# Patient Record
Sex: FEMALE | Race: BLACK OR AFRICAN AMERICAN
[De-identification: names, ages, dates, MRNs, and addresses within clinical notes are randomized per-mention and may not be internally consistent; named-entity substitution may affect disease eponyms.]

---

## 2017-03-11 ENCOUNTER — HOSPITAL ENCOUNTER (EMERGENCY)
Dept: HOSPITAL 62 - ER | Age: 67
Discharge: HOME | End: 2017-03-11
Payer: MEDICARE

## 2017-03-11 VITALS — DIASTOLIC BLOOD PRESSURE: 86 MMHG | SYSTOLIC BLOOD PRESSURE: 142 MMHG

## 2017-03-11 DIAGNOSIS — Z98.51: ICD-10-CM

## 2017-03-11 DIAGNOSIS — R07.9: ICD-10-CM

## 2017-03-11 DIAGNOSIS — R53.1: Primary | ICD-10-CM

## 2017-03-11 DIAGNOSIS — I10: ICD-10-CM

## 2017-03-11 DIAGNOSIS — F32.9: ICD-10-CM

## 2017-03-11 LAB
ALBUMIN SERPL-MCNC: 3.9 G/DL (ref 3.5–5)
ALP SERPL-CCNC: 70 U/L (ref 38–126)
ALT SERPL-CCNC: 36 U/L (ref 9–52)
ANION GAP SERPL CALC-SCNC: 11 MMOL/L (ref 5–19)
AST SERPL-CCNC: 23 U/L (ref 14–36)
BASOPHILS # BLD AUTO: 0 10^3/UL (ref 0–0.2)
BASOPHILS NFR BLD AUTO: 0.8 % (ref 0–2)
BILIRUB DIRECT SERPL-MCNC: 0 MG/DL (ref 0–0.3)
BILIRUB SERPL-MCNC: 0.7 MG/DL (ref 0.2–1.3)
BUN SERPL-MCNC: 13 MG/DL (ref 7–20)
CALCIUM: 9.3 MG/DL (ref 8.4–10.2)
CHLORIDE SERPL-SCNC: 100 MMOL/L (ref 98–107)
CK MB SERPL-MCNC: < 0.22 NG/ML (ref ?–4.55)
CK SERPL-CCNC: 63 U/L (ref 30–135)
CO2 SERPL-SCNC: 29 MMOL/L (ref 22–30)
CREAT SERPL-MCNC: 0.8 MG/DL (ref 0.52–1.25)
EOSINOPHIL # BLD AUTO: 0.1 10^3/UL (ref 0–0.6)
EOSINOPHIL NFR BLD AUTO: 2.9 % (ref 0–6)
ERYTHROCYTE [DISTWIDTH] IN BLOOD BY AUTOMATED COUNT: 12.8 % (ref 11.5–14)
GLUCOSE SERPL-MCNC: 87 MG/DL (ref 75–110)
HCT VFR BLD CALC: 33.5 % (ref 36–47)
HGB BLD-MCNC: 11.8 G/DL (ref 12–15.5)
HGB HCT DIFFERENCE: 1.9
LYMPHOCYTES # BLD AUTO: 2.2 10^3/UL (ref 0.5–4.7)
LYMPHOCYTES NFR BLD AUTO: 55.5 % (ref 13–45)
MCH RBC QN AUTO: 32.8 PG (ref 27–33.4)
MCHC RBC AUTO-ENTMCNC: 35.4 G/DL (ref 32–36)
MCV RBC AUTO: 93 FL (ref 80–97)
MONOCYTES # BLD AUTO: 0.4 10^3/UL (ref 0.1–1.4)
MONOCYTES NFR BLD AUTO: 10.4 % (ref 3–13)
NEUTROPHILS # BLD AUTO: 1.2 10^3/UL (ref 1.7–8.2)
NEUTS SEG NFR BLD AUTO: 30.4 % (ref 42–78)
POTASSIUM SERPL-SCNC: 3.5 MMOL/L (ref 3.6–5)
PROT SERPL-MCNC: 7.4 G/DL (ref 6.3–8.2)
RBC # BLD AUTO: 3.62 10^6/UL (ref 3.72–5.28)
SODIUM SERPL-SCNC: 139.5 MMOL/L (ref 137–145)
TROPONIN I SERPL-MCNC: < 0.012 NG/ML
WBC # BLD AUTO: 3.9 10^3/UL (ref 4–10.5)

## 2017-03-11 PROCEDURE — 99285 EMERGENCY DEPT VISIT HI MDM: CPT

## 2017-03-11 PROCEDURE — 84484 ASSAY OF TROPONIN QUANT: CPT

## 2017-03-11 PROCEDURE — 80053 COMPREHEN METABOLIC PANEL: CPT

## 2017-03-11 PROCEDURE — 82553 CREATINE MB FRACTION: CPT

## 2017-03-11 PROCEDURE — 82550 ASSAY OF CK (CPK): CPT

## 2017-03-11 PROCEDURE — 93010 ELECTROCARDIOGRAM REPORT: CPT

## 2017-03-11 PROCEDURE — 93005 ELECTROCARDIOGRAM TRACING: CPT

## 2017-03-11 PROCEDURE — 85025 COMPLETE CBC W/AUTO DIFF WBC: CPT

## 2017-03-11 PROCEDURE — 71020: CPT

## 2017-03-11 PROCEDURE — 36415 COLL VENOUS BLD VENIPUNCTURE: CPT

## 2017-03-11 NOTE — ER DOCUMENT REPORT
ED Medical Screen (RME)





- General


Stated Complaint: CHEST TIGHTNESS


Mode of Arrival: Ambulatory


Information source: Patient


Notes: 


Patient presents emergency department with complaints of her chest feeling 

funny and SOB for the past 2 weeks.  She does report pain to her left hand.  

Denies past medical history of cardiac disease.








I have greeted and performed a rapid initial assessment of this patient.  A 

comprehensive ED assessment and evaluation of the patient, analysis of test 

results and completion of the medical decision making process will be conducted 

by additional ED providers.


TRAVEL OUTSIDE OF THE [place] IN LAST 30 DAYS: No





- Related Data


Allergies/Adverse Reactions: 


 





No Known Allergies Allergy (Unverified 07/22/14 00:37)


 











Past Medical History





- Past Medical History


Cardiac Medical History: Reports: Hx Hypertension


   Denies: Hx Atrial Fibrillation, Hx Congestive Heart Failure, Hx Heart Attack


Neurological Medical History: Denies: Hx Cerebrovascular Accident


Musculoskeltal Medical History: Reports Hx Arthritis


Past Surgical History: Reports: Hx Herniorrhaphy, Hx Tonsillectomy, Hx Tubal 

Ligation





- Immunizations


Hx Diphtheria, Pertussis, Tetanus Vaccination:  - unk

## 2017-03-11 NOTE — ER DOCUMENT REPORT
ED General





- General


Chief Complaint: Chest Pain


Stated Complaint: CHEST TIGHTNESS


Mode of Arrival: Ambulatory


Information source: Patient


Notes: 


66-year-old female history of depression anxiety presents with complaints of 

feeling weak and not wanting to do any housework.  Patient notes she has a 

history of depression feels like this is her depression worsening.  Patient 

denies any actual chest pain states that she will have palpitations which are 

consistent with her previous anxiety issues.  Patient notes when she takes her 

Xanax for anxiety it resolves.  Patient has had multiple stress tests denies 

any previous cardiac history.  Patient notes that there is been no family 

cardiac history except for congestive heart failure in her mother


TRAVEL OUTSIDE OF THE [place] IN LAST 30 DAYS: No





- HPI


Onset: Other - Symptoms have been ongoing now for years


Onset/Duration: Intermittent


Quality of pain: No pain


Severity: Mild


Pain Level: Denies


Associated symptoms: Weakness


Exacerbated by: Denies


Relieved by: Denies


Similar symptoms previously: Yes


Recently seen / treated by doctor: Yes





- Related Data


Allergies/Adverse Reactions: 


 





No Known Allergies Allergy (Verified 03/11/17 16:09)


 











Past Medical History





- General


Information source: Patient





- Social History


Smoking Status: Never Smoker


Cigarette use (# per day): No


Chew tobacco use (# tins/day): No


Smoking Education Provided: No


Frequency of alcohol use: None


Drug Abuse: None


Family History: Reviewed & Not Pertinent, DM, Hypertension, Thyroid Disfunction


Patient has suicidal ideation: No


Patient has homicidal ideation: No





- Past Medical History


Cardiac Medical History: Reports: Hx Hypertension


   Denies: Hx Atrial Fibrillation, Hx Congestive Heart Failure, Hx Heart Attack


Neurological Medical History: Denies: Hx Cerebrovascular Accident


Renal/ Medical History: Denies: Hx Peritoneal Dialysis


Musculoskeltal Medical History: Reports Hx Arthritis


Past Surgical History: Reports: Hx Herniorrhaphy, Hx Tonsillectomy, Hx Tubal 

Ligation





- Immunizations


Hx Diphtheria, Pertussis, Tetanus Vaccination: No - unk





Review of Systems





- Review of Systems


Notes: 


REVIEW OF SYSTEMS:


CONSTITUTIONAL :  Denies fever,  chills, or sweats.  Denies recent illness.


EENT:   Denies eye, ear, throat, or mouth pain or symptoms.  Denies nasal or 

sinus congestion or discharge.  Denies throat, tongue, or mouth swelling or 

difficulty swallowing.


CARDIOVASCULAR: Admits to palpitations


RESPIRATORY:  Denies cough, cold, or chest congestion.  Denies shortness of 

breath, difficulty breathing, or wheezing.


GASTROINTESTINAL:  Denies abdominal pain or distention.  Denies nausea, vomiting

, or diarrhea.  Denies blood in vomitus, stools, or per rectum.  Denies black, 

tarry stools.  Denies constipation. 


GENITOURINARY:  Denies difficulty urinating, painful urination, burning, 

frequency, blood in urine, or discharge.


FEMALE  GENITOURINARY:  Denies vaginal bleeding, heavy or abnormal periods, 

irregular periods.  Denies vaginal discharge or odor. 


MUSCULOSKELETAL:  Denies back or neck pain or stiffness.  Denies joint pain or 

swelling.


SKIN:   Denies rash, lesions or sores.


HEMATOLOGIC :   Denies easy bruising or bleeding.


LYMPHATIC:  Denies swollen, enlarged glands.


NEUROLOGICAL:  Admits to feeling generalized weakness


PSYCHIATRIC: Admits to depression and anxiety





ALL OTHER SYSTEMS REVIEWED AND NEGATIVE.








Dictation was performed using Dragon voice recognition software 











PHYSICAL EXAMINATION:





GENERAL: Well-appearing, well-nourished and in no acute distress.





HEAD: Atraumatic, normocephalic.





EYES: Pupils equal round and reactive to light, extraocular movements intact, 

conjunctiva are normal.





ENT: Nares patent, oropharynx clear without exudates.  Moist mucous membranes.





NECK: Normal range of motion, supple without lymphadenopathy





LUNGS: Breath sounds clear to auscultation bilaterally and equal.  No wheezes 

rales or rhonchi.





HEART: Regular rate and rhythm without murmurs





ABDOMEN: Soft, nontender, nondistended abdomen.  No guarding, no rebound.  No 

masses appreciated.





Female : deferred





Musculoskeletal: Normal range of motion, no pitting or edema.  No cyanosis.





NEUROLOGICAL: Cranial nerves grossly intact.  Normal speech, normal gait.  

Normal sensory, motor exams





PSYCH: Normal mood, normal affect.





SKIN: Warm, Dry, normal turgor, no rashes or lesions noted.





Physical Exam





- Vital signs


Vitals: 


 











Temp Pulse Resp BP Pulse Ox


 


 98.8 F   60   20   128/75 H  97 


 


 03/11/17 16:09  03/11/17 16:09  03/11/17 16:09  03/11/17 16:09 03/11/17 16:09














Course





- Re-evaluation


Re-evalutation: 





03/11/17 18:34


Physical examination noted no significant abnormality, patient notes complaints 

appeared to be more related to anxiety and depression rather than a cardiac 

event.  Patient was nonetheless offered admission defers at this time states 

that since lab work looks well at this time she wishes to go home.  Patient has 

been instructed to follow-up with cardiology given that this is not a complete 

cardiac evaluation.  Patient states she understands and will do so








After performing a Medical Screening Examination, I estimate there is LOW risk 

for RUPTURED ESOPHAGUS, PNEUMOTHORAX, PULMONARY EMBOLISM, ACUTE CORONARY 

SYNDROME, OR THORACIC AORTIC DISSECTION, thus I consider the discharge 

disposition reasonable. The patient and I have discussed the diagnosis and risks

, and we agree with discharging home with close follow-up. We also discussed 

returning to the Emergency Department immediately if new or worsening symptoms 

occur. We have discussed the symptoms which are most concerning (e.g., bloody 

sputum, worsening pain or shortness of breath) that necessitate immediate 

return.





- Vital Signs


Vital signs: 


 











Temp Pulse Resp BP Pulse Ox


 


 98.8 F   60   18   128/75 H  97 


 


 03/11/17 16:09  03/11/17 16:09  03/11/17 18:12  03/11/17 16:09  03/11/17 16:09














- Laboratory


Result Diagrams: 


 03/11/17 16:25





 03/11/17 16:25


Laboratory results interpreted by me: 


 











  03/11/17 03/11/17





  16:25 16:25


 


WBC  3.9 L 


 


RBC  3.62 L 


 


Hgb  11.8 L 


 


Hct  33.5 L 


 


Plt Count  143 L 


 


Seg Neutrophils %  30.4 L 


 


Lymphocytes %  55.5 H 


 


Absolute Neutrophils  1.2 L 


 


Potassium   3.5 L














- Diagnostic Test


Radiology reviewed: Image reviewed, Reports reviewed





- EKG Interpretation by Me


EKG shows normal: Sinus rhythm, Axis, Intervals, QRS Complexes


Voltage: Consistant with [facility]





Discharge





- Discharge


Clinical Impression: 


 Weakness





Depression


Qualifiers:


 Depression Type: unspecified Qualified Code(s): F32.9 - Major depressive 

disorder, single episode, unspecified





Condition: Stable


Disposition: HOME, SELF-CARE


Instructions:  Chest Pain of Unclear Cause ([facility])


Additional Instructions: 


At this time no life-threatening issues are noted, you must follow-up with 

cardiologist for reevaluation.  Return immediately for any other concerns


Referrals: 


MAGGIE HERNANDEZ MD [ACTIVE STAFF] - Follow up tomorrow

## 2017-03-12 NOTE — EKG REPORT
SEVERITY:- ABNORMAL ECG -

SINUS RHYTHM

LEFT VENTRICULAR HYPERTROPHY

:

Confirmed by: Sebastian Espinal 12-Mar-2017 20:17:27

## 2019-11-26 ENCOUNTER — HOSPITAL ENCOUNTER (OUTPATIENT)
Dept: HOSPITAL 62 - ER | Age: 69
Setting detail: OBSERVATION
LOS: 1 days | Discharge: HOME | End: 2019-11-27
Attending: INTERNAL MEDICINE | Admitting: INTERNAL MEDICINE
Payer: MEDICARE

## 2019-11-26 DIAGNOSIS — M35.00: ICD-10-CM

## 2019-11-26 DIAGNOSIS — G47.33: ICD-10-CM

## 2019-11-26 DIAGNOSIS — Z98.890: ICD-10-CM

## 2019-11-26 DIAGNOSIS — E66.9: ICD-10-CM

## 2019-11-26 DIAGNOSIS — Z98.51: ICD-10-CM

## 2019-11-26 DIAGNOSIS — Z79.899: ICD-10-CM

## 2019-11-26 DIAGNOSIS — F41.9: ICD-10-CM

## 2019-11-26 DIAGNOSIS — I10: ICD-10-CM

## 2019-11-26 DIAGNOSIS — Z82.49: ICD-10-CM

## 2019-11-26 DIAGNOSIS — K21.9: ICD-10-CM

## 2019-11-26 DIAGNOSIS — R07.89: Primary | ICD-10-CM

## 2019-11-26 LAB
ADD MANUAL DIFF: NO
ALBUMIN SERPL-MCNC: 4.1 G/DL (ref 3.5–5)
ALP SERPL-CCNC: 81 U/L (ref 38–126)
ANION GAP SERPL CALC-SCNC: 8 MMOL/L (ref 5–19)
APPEARANCE UR: CLEAR
APTT PPP: (no result) S
AST SERPL-CCNC: 24 U/L (ref 14–36)
BASOPHILS # BLD AUTO: 0 10^3/UL (ref 0–0.2)
BASOPHILS NFR BLD AUTO: 0.7 % (ref 0–2)
BILIRUB DIRECT SERPL-MCNC: 0.1 MG/DL (ref 0–0.4)
BILIRUB SERPL-MCNC: 0.7 MG/DL (ref 0.2–1.3)
BILIRUB UR QL STRIP: NEGATIVE
BUN SERPL-MCNC: 9 MG/DL (ref 7–20)
CALCIUM: 9.4 MG/DL (ref 8.4–10.2)
CHLORIDE SERPL-SCNC: 107 MMOL/L (ref 98–107)
CK MB SERPL-MCNC: 0.46 NG/ML (ref ?–4.55)
CK SERPL-CCNC: 98 U/L (ref 30–135)
CO2 SERPL-SCNC: 27 MMOL/L (ref 22–30)
EOSINOPHIL # BLD AUTO: 0 10^3/UL (ref 0–0.6)
EOSINOPHIL NFR BLD AUTO: 1.1 % (ref 0–6)
ERYTHROCYTE [DISTWIDTH] IN BLOOD BY AUTOMATED COUNT: 13.2 % (ref 11.5–14)
GLUCOSE SERPL-MCNC: 90 MG/DL (ref 75–110)
GLUCOSE UR STRIP-MCNC: NEGATIVE MG/DL
HCT VFR BLD CALC: 33.7 % (ref 36–47)
HGB BLD-MCNC: 12 G/DL (ref 12–15.5)
KETONES UR STRIP-MCNC: NEGATIVE MG/DL
LYMPHOCYTES # BLD AUTO: 1.5 10^3/UL (ref 0.5–4.7)
LYMPHOCYTES NFR BLD AUTO: 36.6 % (ref 13–45)
MCH RBC QN AUTO: 32.7 PG (ref 27–33.4)
MCHC RBC AUTO-ENTMCNC: 35.8 G/DL (ref 32–36)
MCV RBC AUTO: 91 FL (ref 80–97)
MONOCYTES # BLD AUTO: 0.3 10^3/UL (ref 0.1–1.4)
MONOCYTES NFR BLD AUTO: 7.6 % (ref 3–13)
NEUTROPHILS # BLD AUTO: 2.2 10^3/UL (ref 1.7–8.2)
NEUTS SEG NFR BLD AUTO: 54 % (ref 42–78)
NITRITE UR QL STRIP: NEGATIVE
PH UR STRIP: 7 [PH] (ref 5–9)
PLATELET # BLD: 149 10^3/UL (ref 150–450)
POTASSIUM SERPL-SCNC: 3.3 MMOL/L (ref 3.6–5)
PROT SERPL-MCNC: 7.5 G/DL (ref 6.3–8.2)
PROT UR STRIP-MCNC: NEGATIVE MG/DL
RBC # BLD AUTO: 3.69 10^6/UL (ref 3.72–5.28)
SP GR UR STRIP: 1.01
TOTAL CELLS COUNTED % (AUTO): 100 %
TROPONIN I SERPL-MCNC: < 0.012 NG/ML
UROBILINOGEN UR-MCNC: NEGATIVE MG/DL (ref ?–2)
WBC # BLD AUTO: 4.1 10^3/UL (ref 4–10.5)

## 2019-11-26 PROCEDURE — 93017 CV STRESS TEST TRACING ONLY: CPT

## 2019-11-26 PROCEDURE — 85025 COMPLETE CBC W/AUTO DIFF WBC: CPT

## 2019-11-26 PROCEDURE — 80048 BASIC METABOLIC PNL TOTAL CA: CPT

## 2019-11-26 PROCEDURE — 99285 EMERGENCY DEPT VISIT HI MDM: CPT

## 2019-11-26 PROCEDURE — 93010 ELECTROCARDIOGRAM REPORT: CPT

## 2019-11-26 PROCEDURE — 36415 COLL VENOUS BLD VENIPUNCTURE: CPT

## 2019-11-26 PROCEDURE — 84443 ASSAY THYROID STIM HORMONE: CPT

## 2019-11-26 PROCEDURE — 83690 ASSAY OF LIPASE: CPT

## 2019-11-26 PROCEDURE — 74176 CT ABD & PELVIS W/O CONTRAST: CPT

## 2019-11-26 PROCEDURE — 82553 CREATINE MB FRACTION: CPT

## 2019-11-26 PROCEDURE — 83735 ASSAY OF MAGNESIUM: CPT

## 2019-11-26 PROCEDURE — G0378 HOSPITAL OBSERVATION PER HR: HCPCS

## 2019-11-26 PROCEDURE — 94660 CPAP INITIATION&MGMT: CPT

## 2019-11-26 PROCEDURE — 84481 FREE ASSAY (FT-3): CPT

## 2019-11-26 PROCEDURE — 84484 ASSAY OF TROPONIN QUANT: CPT

## 2019-11-26 PROCEDURE — 78452 HT MUSCLE IMAGE SPECT MULT: CPT

## 2019-11-26 PROCEDURE — 82550 ASSAY OF CK (CPK): CPT

## 2019-11-26 PROCEDURE — 93005 ELECTROCARDIOGRAM TRACING: CPT

## 2019-11-26 PROCEDURE — 80053 COMPREHEN METABOLIC PANEL: CPT

## 2019-11-26 PROCEDURE — A9500 TC99M SESTAMIBI: HCPCS

## 2019-11-26 PROCEDURE — 81001 URINALYSIS AUTO W/SCOPE: CPT

## 2019-11-26 PROCEDURE — 84439 ASSAY OF FREE THYROXINE: CPT

## 2019-11-26 RX ADMIN — ASPIRIN SCH MG: 81 TABLET, CHEWABLE ORAL at 20:58

## 2019-11-26 NOTE — RADIOLOGY REPORT (SQ)
EXAM DESCRIPTION:  CT ABD/PELVIS NO ORAL OR IV



COMPLETED DATE/TIME:  11/26/2019 1:36 pm



REASON FOR STUDY:  left sided abdominal pain



COMPARISON:  None.



TECHNIQUE:  CT scan of the abdomen and pelvis performed without intravenous or oral contrast. Images 
reviewed with lung, soft tissue, and bone windows. Reconstructed coronal and sagittal MPR images revi
ewed. All images stored on PACS.

All CT scanners at this facility use dose modulation, iterative reconstruction, and/or weight based d
osing when appropriate to reduce radiation dose to as low as reasonably achievable (ALARA).

[facility]: Dose Right  [facility]: CareDose    [facility]: Dose Right    [facility]: Teradose 4D    [facility]: Smart [employer]



RADIATION DOSE:  CT Rad equipment meets quality standard of care and radiation dose reduction techniq
ues were employed. CTDIvol: 8.6 mGy. DLP: 470 mGy-cm.mGy.



LIMITATIONS:  None.



FINDINGS:  LOWER CHEST: Scattered coronary atherosclerosis.  . No nodules or infiltrates.

NON-CONTRASTED LIVER, SPLEEN, ADRENALS: Evaluation limited by lack of IV contrast. No identified sign
ificant masses.

PANCREAS: No masses. No peripancreatic inflammatory changes.

GALLBLADDER: No identified stones by CT criteria. No inflammatory changes to suggest cholecystitis.

RIGHT KIDNEY AND URETER: No suspicious masses. Assessment limited by lack of IV contrast.   No signif
icant calcifications.   No hydronephrosis or hydroureter.

LEFT KIDNEY AND URETER: No suspicious masses. Assessment limited by lack of IV contrast.   No signifi
cant calcifications.   No hydronephrosis or hydroureter.

AORTA AND RETROPERITONEUM: Mild scattered aortoiliac atherosclerosis without aneurysm.  No retroperit
sinclair mass, hemorrhage or adenopathy.

BOWEL AND PERITONEAL CAVITY: No focal bowel wall thickening.  No evidence of intestinal obstruction. 
 No significant free fluid or free intraperitoneal gas.

APPENDIX: Not identified.

PELVIS, BLADDER, AND ABDOMINAL WALL:No abnormal masses. No free fluid. Bladder normal.

BONES: No acute bony abnormality.  No suspicious lytic or blastic osseous lesions.  Lower lumbar face
t arthropathy.

OTHER: No other significant finding.



IMPRESSION:  No evidence of acute intra-abdominal/ pelvic process on this noncontrast exam.



COMMENT:  Quality ID # [id number]: Final reports with documentation of one or more dose reduction techniques
 (e.g., Automated exposure control, adjustment of the mA and/or kV according to patient size, use of 
iterative reconstruction technique)



TECHNICAL DOCUMENTATION:  JOB ID:  [id number]

 2011 [employer]- All Rights Reserved



Reading location - IP/workstation name: [email or web address]

## 2019-11-26 NOTE — PDOC H&P
History of Present Illness


Admission Date/PCP: 


  11/26/19 17:36





  AXEL CABRERA MD





History of Present Illness: 


AC HELLER is a 69 year old female past medical history of GERD, hypertensi

on, Sjogren's syndrome, anxiety, take to ED complaining of left lower chest and 

abdominal discomfort.  Patient is stating that she has been having diffuse 

abdominal distention, epigastric discomfort radiating to the chest for the last 

several months, has been to her PCP and worked up but has not been able to 

identify the cause of her abdominal and chest discomfort, stating that the 

discomfort has been getting worse and she was referred to see a cardiologist by 

her PCP, as her pain was getting worse she presented to ED to be further 

evaluated.  Denies any fever, shortness of breath, nausea, vomiting, diarrhea, 

constipation or any urinary symptoms.





Patient also endorses history of chronic GERD however has not been evaluated by 

GI and is not on PPIs.





In ED abdominal CT did not show any abnormalities and hospital was consulted for

further management.





Past Medical History


Cardiac Medical History: Reports: Hypertension


   Denies: Atrial Fibrillation, Congestive Heart Failure, Myocardial Infarction


Musculoskeltal Medical History: Reports: Arthritis





Past Surgical History


Past Surgical History: Reports: Herniorrhaphy, Tonsillectomy, Tubal Ligation





Social History


Smoking Status: Never Smoker


Electronic Cigarette use?: No





Family History


Family History: Reviewed & Not Pertinent, DM, Hypertension, Thyroid Disfunction


Parental Family History Reviewed: Yes


Children Family History Reviewed: Yes


Sibling(s) Family History Reviewed.: Yes





Medication/Allergy


Home Medications: 








Alprazolam [Xanax 0.5 mg Tablet] 0.5 mg PO DAILYP PRN 11/26/19 


Atenolol [Tenormin 50 mg Tablet] 50 mg PO DAILY 11/26/19 


Hydrochlorothiazide [Hydrodiuril 25 mg Tablet] 25 mg PO DAILY 11/26/19 


Hydroxychloroquine Sulfate [Plaquenil 200 mg Tablet] 200 mg PO BID 11/26/19 


Pantoprazole Sodium [Protonix] 40 mg PO BID 42 Days #84 tablet. 11/27/19 








Allergies/Adverse Reactions: 


                                        





No Known Allergies Allergy (Verified 11/26/19 15:00)


   











Review of Systems


Review of Systems: 


as per hpi





Physical Exam


Vital Signs: 


                                        











Temp Pulse Resp BP Pulse Ox


 


 98.7 F      16   142/75 H  99 


 


 11/26/19 12:45     11/26/19 18:16  11/26/19 18:16  11/26/19 18:16








                                 Intake & Output











 11/25/19 11/26/19 11/27/19





 06:59 06:59 06:59


 


Weight   81 kg











General appearance: PRESENT: obese


Head exam: PRESENT: atraumatic, normocephalic


Respiratory exam: PRESENT: clear to auscultation james.  ABSENT: rales, rhonchi, 

wheezes


Cardiovascular exam: PRESENT: RRR.  ABSENT: diastolic murmur, rubs, systolic 

murmur


Extremities exam: PRESENT: full ROM.  ABSENT: calf tenderness, clubbing, pedal 

edema


Neurological exam: PRESENT: alert, awake, oriented to person, oriented to place,

oriented to time, oriented to situation, CN II-XII grossly intact.  ABSENT: 

motor sensory deficit





Results


Laboratory Results: 


                                        





                                 11/26/19 12:31 





                                 11/26/19 12:31 





                                        











  11/26/19 11/26/19 11/26/19





  12:31 12:31 13:35


 


WBC  4.1  


 


RBC  3.69 L  


 


Hgb  12.0  


 


Hct  33.7 L  


 


MCV  91  


 


MCH  32.7  


 


MCHC  35.8  


 


RDW  13.2  


 


Plt Count  149 L  


 


Seg Neutrophils %  54.0  


 


Sodium   142.2 


 


Potassium   3.3 L 


 


Chloride   107 


 


Carbon Dioxide   27 


 


Anion Gap   8 


 


BUN   9 


 


Creatinine   0.79 


 


Est GFR ( Amer)   > 60 


 


Glucose   90 


 


Calcium   9.4 


 


Total Bilirubin   0.7 


 


AST   24 


 


Alkaline Phosphatase   81 


 


Total Protein   7.5 


 


Albumin   4.1 


 


Urine Color    STRAW


 


Urine Appearance    CLEAR


 


Urine pH    7.0


 


Ur Specific Gravity    1.010


 


Urine Protein    NEGATIVE


 


Urine Glucose (UA)    NEGATIVE


 


Urine Ketones    NEGATIVE


 


Urine Blood    NEGATIVE


 


Urine Nitrite    NEGATIVE


 


Ur Leukocyte Esterase    NEGATIVE


 


Urine WBC (Auto)    1


 


Urine RBC (Auto)    1








                                        











  11/26/19 11/26/19 11/26/19





  12:31 12:31 17:03


 


Creatine Kinase  98  


 


CK-MB (CK-2)   0.46 


 


Troponin I   < 0.012  < 0.012











Impressions: 


                                        





Abdomen/Pelvis CT  11/26/19 13:16


IMPRESSION:  No evidence of acute intra-abdominal/ pelvic process on this nonco

ntrast exam.


 














Assessment and Plan





- Diagnosis


(1) Chest pain


Qualifiers: 


   Chest pain type: unspecified   Qualified Code(s): R07.9 - Chest pain, 

unspecified   


Is this a current diagnosis for this admission?: Yes   


Plan: 


Unlikely this is cardiac in origin.  Likely due to underlying chronic GERD.


Admit to telemetry, antiplatelets, statins, beta-blockers, PRN morphine, PRN 

nitroglycerin, supplemental oxygen, trend troponins, stress test for further 

risk stratification.








(2) HTN (hypertension)


Is this a current diagnosis for this admission?: Yes   


Plan: 


Normotensive.  Euvolemic.  Restart home meds.  Adjust meds as needed.  

Outpatient PCP follow-up.








(3) NICHOLAS (obstructive sleep apnea)


Is this a current diagnosis for this admission?: Yes   


Plan: 


Has CPAP at home.  Will start on nocturnal CPAP.  Outpatient PCP and pulmonology

follow-up.








(4) Sjogrens syndrome


Is this a current diagnosis for this admission?: Yes   


Plan: 


Not in any acute flare.  Restart home meds.  Outpatient rheumatology follow-up.








(5) Anxiety


Is this a current diagnosis for this admission?: Yes   


Plan: 


Restart home meds.








(6) GERD (gastroesophageal reflux disease)


Qualifiers: 


   Esophagitis presence: esophagitis presence not specified   Qualified Code(s):

K21.9 - Gastro-esophageal reflux disease without esophagitis   


Is this a current diagnosis for this admission?: Yes   


Plan: 


Chronic.  Has not been evaluated by GI.  Denies any melena or weight changes.


Will start on PPIs.

## 2019-11-26 NOTE — EKG REPORT
SEVERITY:- ABNORMAL ECG -

SINUS RHYTHM

LEFT VENTRICULAR HYPERTROPHY

:

Confirmed by: Sebastian Espinal 26-Nov-2019 18:33:31

## 2019-11-27 VITALS — DIASTOLIC BLOOD PRESSURE: 70 MMHG | SYSTOLIC BLOOD PRESSURE: 146 MMHG

## 2019-11-27 LAB
ADD MANUAL DIFF: NO
ANION GAP SERPL CALC-SCNC: 7 MMOL/L (ref 5–19)
BASOPHILS # BLD AUTO: 0 10^3/UL (ref 0–0.2)
BASOPHILS NFR BLD AUTO: 0.5 % (ref 0–2)
BUN SERPL-MCNC: 13 MG/DL (ref 7–20)
CALCIUM: 9.4 MG/DL (ref 8.4–10.2)
CHLORIDE SERPL-SCNC: 110 MMOL/L (ref 98–107)
CO2 SERPL-SCNC: 27 MMOL/L (ref 22–30)
EOSINOPHIL # BLD AUTO: 0.1 10^3/UL (ref 0–0.6)
EOSINOPHIL NFR BLD AUTO: 1.9 % (ref 0–6)
ERYTHROCYTE [DISTWIDTH] IN BLOOD BY AUTOMATED COUNT: 12.9 % (ref 11.5–14)
FREE T4 (FREE THYROXINE): 0.98 NG/DL (ref 0.78–2.19)
GLUCOSE SERPL-MCNC: 88 MG/DL (ref 75–110)
HCT VFR BLD CALC: 31.9 % (ref 36–47)
HGB BLD-MCNC: 11.5 G/DL (ref 12–15.5)
LYMPHOCYTES # BLD AUTO: 1.7 10^3/UL (ref 0.5–4.7)
LYMPHOCYTES NFR BLD AUTO: 46.6 % (ref 13–45)
MCH RBC QN AUTO: 32.9 PG (ref 27–33.4)
MCHC RBC AUTO-ENTMCNC: 36 G/DL (ref 32–36)
MCV RBC AUTO: 92 FL (ref 80–97)
MONOCYTES # BLD AUTO: 0.4 10^3/UL (ref 0.1–1.4)
MONOCYTES NFR BLD AUTO: 11 % (ref 3–13)
NEUTROPHILS # BLD AUTO: 1.5 10^3/UL (ref 1.7–8.2)
NEUTS SEG NFR BLD AUTO: 40 % (ref 42–78)
PLATELET # BLD: 140 10^3/UL (ref 150–450)
POTASSIUM SERPL-SCNC: 4.1 MMOL/L (ref 3.6–5)
RBC # BLD AUTO: 3.49 10^6/UL (ref 3.72–5.28)
T3FREE SERPL-MCNC: 2.8 PG/ML (ref 2.77–5.27)
TOTAL CELLS COUNTED % (AUTO): 100 %
TSH SERPL-ACNC: 2.81 UIU/ML (ref 0.47–4.68)
WBC # BLD AUTO: 3.7 10^3/UL (ref 4–10.5)

## 2019-11-27 RX ADMIN — ASPIRIN SCH MG: 81 TABLET, CHEWABLE ORAL at 10:33

## 2019-11-29 NOTE — PDOC DISCHARGE SUMMARY
Impression





- Admit/DC Date/PCP


Admission Date/Primary Care Provider: 


  11/26/19 17:36





  AXEL CABRERA MD





Discharge Date: 11/27/19





- Discharge Diagnosis


(1) Chest pain


Is this a current diagnosis for this admission?: Yes   





(2) HTN (hypertension)


Is this a current diagnosis for this admission?: Yes   





(3) NICHOLAS (obstructive sleep apnea)


Is this a current diagnosis for this admission?: Yes   





(4) Sjogrens syndrome


Is this a current diagnosis for this admission?: Yes   





(5) Anxiety


Is this a current diagnosis for this admission?: Yes   





(6) GERD (gastroesophageal reflux disease)


Is this a current diagnosis for this admission?: Yes   





- Additional Information


Resuscitation Status: Full Code


Discharge Diet: As Tolerated


Discharge Activity: Activity As Tolerated, Balance Activity w/Rest


Referrals: 


XAEL CABRERA MD [Primary Care Provider] - 


()


Prescriptions: 


Pantoprazole Sodium [Protonix] 40 mg PO BID 42 Days #84 tablet.


Home Medications: 








Alprazolam [Xanax 0.5 mg Tablet] 0.5 mg PO DAILYP PRN 11/26/19 


Atenolol [Tenormin 50 mg Tablet] 50 mg PO DAILY 11/26/19 


Hydrochlorothiazide [Hydrodiuril 25 mg Tablet] 25 mg PO DAILY 11/26/19 


Hydroxychloroquine Sulfate [Plaquenil 200 mg Tablet] 200 mg PO BID 11/26/19 


Pantoprazole Sodium [Protonix] 40 mg PO BID 42 Days #84 tablet. 11/27/19 











History of Present Illiness


History of Present Illness: 


AC HELLER is a 69 year old female past medical history of GERD, 

hypertension, Sjogren's syndrome, anxiety, take to ED complaining of left lower 

chest and abdominal discomfort.  Patient is stating that she has been having 

diffuse abdominal distention, epigastric discomfort radiating to the chest for 

the last several months, has been to her PCP and worked up but has not been able

to identify the cause of her abdominal and chest discomfort, stating that the 

discomfort has been getting worse and she was referred to see a cardiologist by 

her PCP, as her pain was getting worse she presented to ED to be further 

evaluated.  Denies any fever, shortness of breath, nausea, vomiting, diarrhea, 

constipation or any urinary symptoms.





Patient also endorses history of chronic GERD however has not been evaluated by 

GI and is not on PPIs.





In ED abdominal CT did not show any abnormalities and hospital was consulted for

further management.








Hospital Course


Hospital Course: 


(1) Chest pain/discomfort


Resoled. Likely due to underlying chronic untreated GERD.


Patient has significant improvement of her abdominal and chest discomfort after 

being started on high-dose PPIs.


Was admitted telemetry, antiplatelets, statins, beta-blockers, PRN morphine, PRN

nitroglycerin, supplemental oxygen. 


Troponins negative x3, EKG no change acute changes, nuclear stress test 

negative. 





Patient was discharged home to restart home meds and pantoprazole 40 mg twice 

daily for 6 weeks and follow-up with PCP and gastroenterologist.





(2) HTN (hypertension)


Normotensive.  Euvolemic.  


Restarted on home meds.  





(3) NICHOLAS (obstructive sleep apnea)


Has CPAP at home.  


Was a started on nocturnal CPAP.  





(4) Sjogrens syndrome


Not in any acute flare.  


Restarted on home meds.


Outpatient rheumatology follow-up.





(5) Anxiety


Restart home meds.





(6) GERD (gastroesophageal reflux disease)


Chronic.  Has not been evaluated by GI.  Denies any melena or weight changes.


Was a started on pantoprazole 40 mg twice daily.


Patient was discharged home to restart home meds and pantoprazole 40 mg twice 

daily for 6 weeks and follow-up with PCP and gastroenterologist.





Physical Exam


Vital Signs: 


                                        











Temp Pulse Resp BP Pulse Ox


 


 98.6 F   74   16   146/70 H  100 


 


 11/27/19 15:56  11/27/19 15:56  11/27/19 15:56  11/27/19 15:56  11/27/19 15:56











General appearance: PRESENT: no acute distress, well-developed, well-nourished


Head exam: PRESENT: atraumatic, normocephalic


Respiratory exam: PRESENT: clear to auscultation james.  ABSENT: rales, rhonchi, 

wheezes


Cardiovascular exam: PRESENT: RRR.  ABSENT: diastolic murmur, rubs, systolic 

murmur


GI/Abdominal exam: PRESENT: normal bowel sounds, soft.  ABSENT: distended, 

guarding, mass, organolmegaly, rebound, tenderness


Neurological exam: PRESENT: alert, awake, oriented to person, oriented to place,

oriented to time, oriented to situation, CN II-XII grossly intact.  ABSENT: 

motor sensory deficit





Results


Laboratory Results: 


                                        











WBC  3.7 10^3/uL (4.0-10.5)  L  11/27/19  05:47    


 


RBC  3.49 10^6/uL (3.72-5.28)  L  11/27/19  05:47    


 


Hgb  11.5 g/dL (12.0-15.5)  L  11/27/19  05:47    


 


Hct  31.9 % (36.0-47.0)  L  11/27/19  05:47    


 


MCV  92 fl (80-97)   11/27/19  05:47    


 


MCH  32.9 pg (27.0-33.4)   11/27/19  05:47    


 


MCHC  36.0 g/dL (32.0-36.0)   11/27/19  05:47    


 


RDW  12.9 % (11.5-14.0)   11/27/19  05:47    


 


Plt Count  140 10^3/uL (150-450)  L  11/27/19  05:47    


 


Lymph % (Auto)  46.6 % (13-45)  H  11/27/19  05:47    


 


Mono % (Auto)  11.0 % (3-13)   11/27/19  05:47    


 


Eos % (Auto)  1.9 % (0-6)   11/27/19  05:47    


 


Baso % (Auto)  0.5 % (0-2)   11/27/19  05:47    


 


Absolute Neuts (auto)  1.5 10^3/uL (1.7-8.2)  L  11/27/19  05:47    


 


Absolute Lymphs (auto)  1.7 10^3/uL (0.5-4.7)   11/27/19  05:47    


 


Absolute Monos (auto)  0.4 10^3/uL (0.1-1.4)   11/27/19  05:47    


 


Absolute Eos (auto)  0.1 10^3/uL (0.0-0.6)   11/27/19  05:47    


 


Absolute Basos (auto)  0.0 10^3/uL (0.0-0.2)   11/27/19  05:47    


 


Seg Neutrophils %  40.0 % (42-78)  L  11/27/19  05:47    


 


Sodium  143.5 mmol/L (137-145)   11/27/19  05:47    


 


Potassium  4.1 mmol/L (3.6-5.0)   11/27/19  05:47    


 


Chloride  110 mmol/L ()  H  11/27/19  05:47    


 


Carbon Dioxide  27 mmol/L (22-30)   11/27/19  05:47    


 


Anion Gap  7  (5-19)   11/27/19  05:47    


 


BUN  13 mg/dL (7-20)   11/27/19  05:47    


 


Creatinine  0.83 mg/dL (0.52-1.25)   11/27/19  05:47    


 


Est GFR ( Amer)  > 60  (>60)   11/27/19  05:47    


 


Est GFR (MDRD) Non-Af  > 60  (>60)   11/27/19  05:47    


 


Glucose  88 mg/dL ()   11/27/19  05:47    


 


Calcium  9.4 mg/dL (8.4-10.2)   11/27/19  05:47    


 


Magnesium  1.9 mg/dL (1.6-2.3)   11/27/19  05:47    


 


Total Bilirubin  0.7 mg/dL (0.2-1.3)   11/26/19  12:31    


 


Direct Bilirubin  0.1 mg/dL (0.0-0.4)   11/26/19  12:31    


 


Neonat Total Bilirubin  Not Reportable   11/26/19  12:31    


 


Neonat Direct Bilirubin  Not Reportable   11/26/19  12:31    


 


Neonat Indirect Bili  Not Reportable   11/26/19  12:31    


 


AST  24 U/L (14-36)   11/26/19  12:31    


 


ALT  14 U/L (<35)   11/26/19  12:31    


 


Alkaline Phosphatase  81 U/L ()   11/26/19  12:31    


 


Creatine Kinase  98 U/L ()   11/26/19  12:31    


 


CK-MB (CK-2)  0.46 ng/mL (<4.55)   11/26/19  12:31    


 


Troponin I  < 0.012 ng/mL  11/26/19  23:06    


 


Total Protein  7.5 g/dL (6.3-8.2)   11/26/19  12:31    


 


Albumin  4.1 g/dL (3.5-5.0)   11/26/19  12:31    


 


Lipase  70.4 U/L ()   11/27/19  05:47    


 


TSH  2.81 uIU/mL (0.47-4.68)   11/27/19  05:47    


 


Free T4  0.98 ng/dL (0.78-2.19)   11/27/19  05:47    


 


Free T3 pg/mL  2.80 pg/mL (2.77-5.27)   11/27/19  05:47    


 


Urine Color  STRAW   11/26/19  13:35    


 


Urine Appearance  CLEAR   11/26/19  13:35    


 


Urine pH  7.0  (5.0-9.0)   11/26/19  13:35    


 


Ur Specific Gravity  1.010   11/26/19  13:35    


 


Urine Protein  NEGATIVE mg/dL (NEGATIVE)   11/26/19  13:35    


 


Urine Glucose (UA)  NEGATIVE mg/dL (NEGATIVE)   11/26/19  13:35    


 


Urine Ketones  NEGATIVE mg/dL (NEGATIVE)   11/26/19  13:35    


 


Urine Blood  NEGATIVE  (NEGATIVE)   11/26/19  13:35    


 


Urine Nitrite  NEGATIVE  (NEGATIVE)   11/26/19  13:35    


 


Urine Bilirubin  NEGATIVE  (NEGATIVE)   11/26/19  13:35    


 


Urine Urobilinogen  NEGATIVE mg/dL (<2.0)   11/26/19  13:35    


 


Ur Leukocyte Esterase  NEGATIVE  (NEGATIVE)   11/26/19  13:35    


 


Urine WBC (Auto)  1 /HPF  11/26/19  13:35    


 


Urine RBC (Auto)  1 /HPF  11/26/19  13:35    


 


Urine Bacteria (Auto)  TRACE /HPF  11/26/19  13:35    


 


Squamous Epi Cells Auto  1 /HPF  11/26/19  13:35    


 


Urine Mucus (Auto)  RARE /LPF  11/26/19  13:35    


 


Urine Ascorbic Acid  NEGATIVE  (NEGATIVE)   11/26/19  13:35    








                                        











  11/26/19 11/26/19 11/26/19





  12:31 17:03 23:06


 


CK-MB (CK-2)  0.46  


 


Troponin I  < 0.012  < 0.012  < 0.012











Impressions: 


                                        





Abdomen/Pelvis CT  11/26/19 13:16


IMPRESSION:  No evidence of acute intra-abdominal/ pelvic process on this 

noncontrast exam.


 














Stroke


Is this a Stroke Patient?: No





Acute Heart Failure





- **


Is this a Heart Failure Patient?: No

## 2019-12-01 NOTE — DRAGON STRESS TEST REPORT
Intravenous Lexiscan Cardiolite stress test using single photon emmision 
computerized tomography



Date of procedure: 11/27/2019. Ordering Provider: . Patient's status: 
In Patient.



Indication: Chest pain.  Coronary risk factors: Age, diabetes mellitus, 
hypertension, and dyslipidemia.



Resting EKG: Sinus Rhythm.  Nonspecific ST-T changes leads V1 to V4.



Stress EKG: No changes of ischemia.



Patient had no chest pain or discomfort and there were no arrhythmias seen.



Reason for termination: Protocol.



Conclusions: Normal EKG and hemodynamic response to IV Lexiscan.



Nuclear data:

At rest the patient was given 12.91 millicuries of technetium 99m sestamibi 
injected intravenously.  As per protocol rest non gated SPECT images were 
obtained.  Subsequently the patient was given intravenous Lexiscan at a dose of 
0.4 mg in 5 mL intravenously, followed by flush with normal saline.  
Subsequently the stress dose of 6.8 millicuries of technetium 99m sestamibi was 
injected intravenously.  As per protocol stress gated images were obtained. 



Nuclear interpretation: 



Review of images showed that all segments of the myocardium had normal perfusion
at rest, and normal perfusion post stress with IV Lexiscan.

All segments of the myocardium had normal motion, contraction, and thickening by
gated study.

T. I D. ratio was abnormal at 1.22.  Visually this is not reliable, and the T I 
D ratio is normal..  There is no transient ischemic dilatation of the left 
ventricle.  Computer read rest, and stress left ventricular ejection fraction 
were 58 %, and 53%, respectively.  Visually both the stress and rest ejection 
fractions were normal, and greater than 55%.



Conclusion:



1.  There is no scintigraphic evidence of Lexiscan induced myocardial ischemia.

2.  There is no scintigraphic evidence of myocardial infarction/scar.





Recommendations:

Aggressive risk factor modification, and treating the underlying co- 
morbidities.  

MTDD

## 2019-12-03 ENCOUNTER — HOSPITAL ENCOUNTER (EMERGENCY)
Dept: HOSPITAL 62 - ER | Age: 69
Discharge: HOME | End: 2019-12-03
Payer: MEDICARE

## 2019-12-03 VITALS — SYSTOLIC BLOOD PRESSURE: 145 MMHG | DIASTOLIC BLOOD PRESSURE: 72 MMHG

## 2019-12-03 DIAGNOSIS — M35.00: ICD-10-CM

## 2019-12-03 DIAGNOSIS — K21.9: ICD-10-CM

## 2019-12-03 DIAGNOSIS — Z98.51: ICD-10-CM

## 2019-12-03 DIAGNOSIS — K52.9: Primary | ICD-10-CM

## 2019-12-03 DIAGNOSIS — R42: ICD-10-CM

## 2019-12-03 DIAGNOSIS — I10: ICD-10-CM

## 2019-12-03 LAB
ADD MANUAL DIFF: NO
ALBUMIN SERPL-MCNC: 4.1 G/DL (ref 3.5–5)
ALP SERPL-CCNC: 87 U/L (ref 38–126)
ANION GAP SERPL CALC-SCNC: 11 MMOL/L (ref 5–19)
AST SERPL-CCNC: 22 U/L (ref 14–36)
BASOPHILS # BLD AUTO: 0 10^3/UL (ref 0–0.2)
BASOPHILS NFR BLD AUTO: 0.8 % (ref 0–2)
BILIRUB DIRECT SERPL-MCNC: 0.1 MG/DL (ref 0–0.4)
BILIRUB SERPL-MCNC: 0.7 MG/DL (ref 0.2–1.3)
BUN SERPL-MCNC: 14 MG/DL (ref 7–20)
CALCIUM: 9.7 MG/DL (ref 8.4–10.2)
CHLORIDE SERPL-SCNC: 106 MMOL/L (ref 98–107)
CO2 SERPL-SCNC: 24 MMOL/L (ref 22–30)
EOSINOPHIL # BLD AUTO: 0.1 10^3/UL (ref 0–0.6)
EOSINOPHIL NFR BLD AUTO: 1.4 % (ref 0–6)
ERYTHROCYTE [DISTWIDTH] IN BLOOD BY AUTOMATED COUNT: 13.2 % (ref 11.5–14)
GLUCOSE SERPL-MCNC: 156 MG/DL (ref 75–110)
HCT VFR BLD CALC: 35.4 % (ref 36–47)
HGB BLD-MCNC: 12.6 G/DL (ref 12–15.5)
LYMPHOCYTES # BLD AUTO: 1.7 10^3/UL (ref 0.5–4.7)
LYMPHOCYTES NFR BLD AUTO: 44.6 % (ref 13–45)
MCH RBC QN AUTO: 32.6 PG (ref 27–33.4)
MCHC RBC AUTO-ENTMCNC: 35.6 G/DL (ref 32–36)
MCV RBC AUTO: 91 FL (ref 80–97)
MONOCYTES # BLD AUTO: 0.4 10^3/UL (ref 0.1–1.4)
MONOCYTES NFR BLD AUTO: 9.9 % (ref 3–13)
NEUTROPHILS # BLD AUTO: 1.7 10^3/UL (ref 1.7–8.2)
NEUTS SEG NFR BLD AUTO: 43.3 % (ref 42–78)
PLATELET # BLD: 156 10^3/UL (ref 150–450)
POTASSIUM SERPL-SCNC: 3.3 MMOL/L (ref 3.6–5)
PROT SERPL-MCNC: 7.5 G/DL (ref 6.3–8.2)
RBC # BLD AUTO: 3.87 10^6/UL (ref 3.72–5.28)
TOTAL CELLS COUNTED % (AUTO): 100 %
WBC # BLD AUTO: 3.9 10^3/UL (ref 4–10.5)

## 2019-12-03 PROCEDURE — 80053 COMPREHEN METABOLIC PANEL: CPT

## 2019-12-03 PROCEDURE — 83735 ASSAY OF MAGNESIUM: CPT

## 2019-12-03 PROCEDURE — 83690 ASSAY OF LIPASE: CPT

## 2019-12-03 PROCEDURE — 83605 ASSAY OF LACTIC ACID: CPT

## 2019-12-03 PROCEDURE — 36415 COLL VENOUS BLD VENIPUNCTURE: CPT

## 2019-12-03 PROCEDURE — 96360 HYDRATION IV INFUSION INIT: CPT

## 2019-12-03 PROCEDURE — 85025 COMPLETE CBC W/AUTO DIFF WBC: CPT

## 2019-12-03 PROCEDURE — 96361 HYDRATE IV INFUSION ADD-ON: CPT

## 2019-12-03 PROCEDURE — 84484 ASSAY OF TROPONIN QUANT: CPT

## 2019-12-03 PROCEDURE — 99284 EMERGENCY DEPT VISIT MOD MDM: CPT

## 2019-12-03 PROCEDURE — 70450 CT HEAD/BRAIN W/O DYE: CPT

## 2019-12-03 PROCEDURE — 93005 ELECTROCARDIOGRAM TRACING: CPT

## 2019-12-03 PROCEDURE — 93010 ELECTROCARDIOGRAM REPORT: CPT

## 2019-12-03 NOTE — EKG REPORT
SEVERITY:- ABNORMAL ECG -

SINUS RHYTHM

PROBABLE LEFT ATRIAL ABNORMALITY

ANTERIOR Q WAVES, POSSIBLY DUE TO LVH

:

Confirmed by: Samreen Yoder MD 03-Dec-2019 14:32:19

## 2019-12-03 NOTE — ER DOCUMENT REPORT
ED General





- General


Chief Complaint: Dizziness


Stated Complaint: DIZZINESS


Time Seen by Provider: 12/03/19 08:15


Primary Care Provider: 


AXEL CABRERA MD [Primary Care Provider] - Follow up as needed


Notes: 





69 year old female past medical history of GERD, hypertension, Sjogren's 

syndrome, anxiety, NICHOLAS presents with dizziness associated with nausea and 

nonbloody, nonbilious emesis that began 1 week ago.  Also some ringing in right 

ear.  Admitted here 11/26 -29 and had a reassuring workup for chest pain with an

unremarkable lexiscan study. No chest pain today. 


TRAVEL OUTSIDE OF THE [place] IN LAST 30 DAYS: No





- HPI


Severity: Moderate


Associated symptoms: None


Exacerbated by: Denies


Relieved by: Denies





- Related Data


Allergies/Adverse Reactions: 


                                        





No Known Allergies Allergy (Verified 11/26/19 15:00)


   











Past Medical History





- Social History


Smoking Status: Former Smoker


Family History: Reviewed & Not Pertinent, DM, Hypertension, Thyroid Disfunction


Patient has suicidal ideation: No


Patient has homicidal ideation: No





- Past Medical History


Cardiac Medical History: Reports: Hx Hypertension


   Denies: Hx Atrial Fibrillation, Hx Congestive Heart Failure, Hx Heart Attack


Neurological Medical History: Denies: Hx Cerebrovascular Accident


Renal/ Medical History: Denies: Hx Peritoneal Dialysis


Musculoskeletal Medical History: Reports Hx Arthritis


Past Surgical History: Reports: Hx Herniorrhaphy, Hx Tonsillectomy, Hx Tubal 

Ligation





- Immunizations


Hx Diphtheria, Pertussis, Tetanus Vaccination: No - unk





Review of Systems





- Review of Systems


Constitutional: No symptoms reported


EENT: No symptoms reported


Cardiovascular: No symptoms reported


Respiratory: No symptoms reported


Gastrointestinal: No symptoms reported


Genitourinary: No symptoms reported


Female Genitourinary: No symptoms reported


Musculoskeletal: No symptoms reported


Skin: No symptoms reported


Hematologic/Lymphatic: No symptoms reported


Neurological/Psychological: No symptoms reported





Physical Exam





- Vital signs


Vitals: 


                                        











Temp Pulse Resp BP Pulse Ox


 


 97.5 F   84   16   170/90 H  96 


 


 12/03/19 08:12  12/03/19 08:12  12/03/19 08:12  12/03/19 08:12  12/03/19 08:12











Interpretation: Normal





- General


General appearance: Appears well, Alert





- HEENT


Head: Normocephalic, Atraumatic


Eyes: Normal


Pupils: PERRL





- Respiratory


Respiratory status: No respiratory distress


Chest status: Nontender


Breath sounds: Normal


Chest palpation: Normal





- Cardiovascular


Rhythm: Regular


Heart sounds: Normal auscultation


Murmur: No





- Abdominal


Inspection: Normal


Distension: No distension


Bowel sounds: Normal


Tenderness: Nontender


Organomegaly: No organomegaly





- Back


Back: Normal, Nontender





- Extremities


General upper extremity: Normal inspection, Nontender, Normal color, Normal ROM,

Normal temperature


General lower extremity: Normal inspection, Nontender, Normal color, Normal ROM,

Normal temperature, Normal weight bearing.  No: Andres's sign





- Neurological


Neuro grossly intact: Yes


Cognition: Normal


Orientation: AAOx4


Ruchi Coma Scale Eye Opening: Spontaneous


Mccleary Coma Scale Verbal: Oriented


Ruchi Coma Scale Motor: Obeys Commands


Ruchi Coma Scale Total: 15


Speech: Normal


Motor strength normal: LUE, RUE, LLE, RLE


Sensory: Normal





- Psychological


Associated symptoms: Normal affect, Normal mood





- Skin


Skin Temperature: Warm


Skin Moisture: Dry


Skin Color: Normal





Course





- Re-evaluation


Re-evalutation: 





12/03/19 12:08


mdm  69 year old with what appears as viral illness and feels better here after 

IVF and anti emetics. 





- Vital Signs


Vital signs: 


                                        











Temp Pulse Resp BP Pulse Ox


 


 97.5 F   84   13   154/88 H  100 


 


 12/03/19 08:12  12/03/19 08:12  12/03/19 10:23  12/03/19 10:23  12/03/19 10:23














- Laboratory


Result Diagrams: 


                                 12/03/19 08:40





                                 12/03/19 08:40


Laboratory results interpreted by me: 


                                        











  12/03/19 12/03/19





  08:40 08:40


 


WBC   3.9 L


 


Hct   35.4 L


 


Potassium  3.3 L 


 


Glucose  156 H 














- Diagnostic Test


Radiology reviewed: Reports reviewed





- EKG Interpretation by Me


EKG shows normal: Sinus rhythm


Rate: Normal - NSR NL Axis 71 BPM no st elevation or depression my 

interpretation.





Discharge





- Discharge


Clinical Impression: 


 Dizziness, Enteritis





HTN (hypertension)


Qualifiers:


 Hypertension type: unspecified Qualified Code(s): I10 - Essential (primary) 

hypertension





Sjogrens syndrome


Qualifiers:


 Sjogren's organ involvement: unspecified organ involvement Qualified Code(s): 

M35.00 - Sicca syndrome, unspecified





Disposition: HOME, SELF-CARE


Instructions:  Antinausea Medication ([facility]), Dizziness ([facility]), Meclizine ([facility])


Additional Instructions: 


See your doctor or the referral doctor in follow up.  Rest.  Please return here 

for any problems or any concerns. Clear liquids for the next 24 hours.  Your 

potassium was a bit low and should be re checked by your regular doctor in the 

next week.  


Prescriptions: 


Meclizine HCl [Antivert 12.5 mg Tablet] 12.5 mg PO TID #21 tablet


Ondansetron [Zofran Odt 4 mg Tablet] 1 - 2 tab PO Q4H PRN #15 tab.rapdis


 PRN Reason: For Nausea/Vomiting


Referrals: 


AXEL CABRERA MD [Primary Care Provider] - Follow up as needed

## 2019-12-03 NOTE — RADIOLOGY REPORT (SQ)
EXAM DESCRIPTION:  CT HEAD WITHOUT



COMPLETED DATE/TIME:  12/3/2019 10:00 am



REASON FOR STUDY:  weak/ dizzy



COMPARISON:  None.



TECHNIQUE:  Axial images acquired through the brain without intravenous contrast.  Images reviewed wi
th bone, brain and subdural windows.  Additional sagittal and coronal reconstructions were generated.
 Images stored on PACS.

All CT scanners at this facility use dose modulation, iterative reconstruction, and/or weight based d
osing when appropriate to reduce radiation dose to as low as reasonably achievable (ALARA).

[facility]: Dose Right  [facility]: CareDose    [facility]: Dose Right    [facility]: Teradose 4D    [facility]: Smart Technologies



RADIATION DOSE:  CT Rad equipment meets quality standard of care and radiation dose reduction techniq
ues were employed. CTDIvol: 53.2 mGy. DLP: 1203 mGy-cm. mGy.



LIMITATIONS:  None.



FINDINGS:  VENTRICLES: Normal size and contour.

CEREBRUM: No masses.  No hemorrhage.  No midline shift.  No evidence for acute infarction. Normal gra
y/white matter differentiation. No areas of low density in the white matter.

CEREBELLUM: No masses.  No hemorrhage.  No alteration of density.  No evidence for acute infarction.

EXTRAAXIAL SPACES: No fluid collections.  No masses.

ORBITS AND GLOBE: No intra- or extraconal masses.  Normal contour of globe without masses.

CALVARIUM: No fracture.

PARANASAL SINUSES: No fluid or mucosal thickening.

SOFT TISSUES: No mass or hematoma.

OTHER: No other significant finding.



IMPRESSION:  NORMAL BRAIN CT WITHOUT CONTRAST.

EVIDENCE OF ACUTE STROKE: NO.



COMMENT:  Quality ID # [id number]: Final reports with documentation of one or more dose reduction techniques
 (e.g., Automated exposure control, adjustment of the mA and/or kV according to patient size, use of 
iterative reconstruction technique)



TECHNICAL DOCUMENTATION:  JOB ID:  [id number]

 2011 [employer]- All Rights Reserved



Reading location - IP/workstation name: [email or web address]

## 2020-05-26 ENCOUNTER — HOSPITAL ENCOUNTER (EMERGENCY)
Dept: HOSPITAL 62 - ER | Age: 70
Discharge: HOME | End: 2020-05-26
Payer: MEDICARE

## 2020-05-26 VITALS — DIASTOLIC BLOOD PRESSURE: 75 MMHG | SYSTOLIC BLOOD PRESSURE: 159 MMHG

## 2020-05-26 DIAGNOSIS — Z87.891: ICD-10-CM

## 2020-05-26 DIAGNOSIS — R20.0: ICD-10-CM

## 2020-05-26 DIAGNOSIS — M54.32: Primary | ICD-10-CM

## 2020-05-26 DIAGNOSIS — M79.605: ICD-10-CM

## 2020-05-26 DIAGNOSIS — Z79.899: ICD-10-CM

## 2020-05-26 DIAGNOSIS — I10: ICD-10-CM

## 2020-05-26 PROCEDURE — 72110 X-RAY EXAM L-2 SPINE 4/>VWS: CPT

## 2020-05-26 PROCEDURE — 99284 EMERGENCY DEPT VISIT MOD MDM: CPT

## 2020-05-26 PROCEDURE — 93971 EXTREMITY STUDY: CPT

## 2020-05-26 NOTE — ER DOCUMENT REPORT
ED General





- General


Chief Complaint: Leg Pain


Stated Complaint: LEG PAIN


Time Seen by Provider: 05/26/20 18:51


Primary Care Provider: 


AXEL CABRERA MD [Primary Care Provider] - Follow up as needed


Mode of Arrival: Wheelchair


TRAVEL OUTSIDE OF THE [place] IN LAST 30 DAYS: No





- HPI


Notes: 





Patient is a 70-year-old female who presents to the emergency department for 

evaluation of left leg pain.  She points to the lateral aspect of the leg.  She 

states this started about 4 PM, after getting her radiation treatment.  She 

states she is had pain similar to this in the past.  It is a sharp pain, with a 

numb sensation in her fifth left toe.  She does have a history of back problems.

 She denies any bowel or bladder incontinence, no saddle anesthesia, no focal 

numbness or weakness.  She denies any chest pain or shortness of breath.  She 

states currently her pain is a 1 out of 5, earlier it was a 4 out of 5.  She 

takes Tylenol at home when her pain is severe, but states that did not really 

help tonight, so she presents to the ED for further evaluation.





- Related Data


Allergies/Adverse Reactions: 


                                        





No Known Allergies Allergy (Verified 05/26/20 18:46)


   











Past Medical History





- General


Information source: Patient





- Social History


Smoking Status: Former Smoker


Chew tobacco use (# tins/day): No


Frequency of alcohol use: None


Family History: Reviewed & Not Pertinent, DM, Hypertension, Thyroid Disfunction


Patient has homicidal ideation: No





- Past Medical History


Cardiac Medical History: Reports: Hx Hypertension


   Denies: Hx Atrial Fibrillation, Hx Congestive Heart Failure, Hx Heart Attack


Neurological Medical History: Denies: Hx Cerebrovascular Accident


Renal/ Medical History: Denies: Hx Peritoneal Dialysis


Malignancy Medical History: Reports: Other - Vulvar cancer, currently on 

radiation


Musculoskeletal Medical History: Reports Hx Arthritis


Past Surgical History: Reports: Hx Herniorrhaphy, Hx Tonsillectomy, Hx Tubal 

Ligation





- Immunizations


Hx Diphtheria, Pertussis, Tetanus Vaccination: No - unk





Review of Systems





- Review of Systems


Musculoskeletal: See HPI


-: Yes All other systems reviewed and negative





Physical Exam





- Vital signs


Vitals: 





                                        











Temp Pulse Resp BP Pulse Ox


 


 99.2 F   91   18   196/88 H  99 


 


 05/26/20 18:44  05/26/20 18:44  05/26/20 18:44  05/26/20 18:44  05/26/20 18:44














- Notes


Notes: 





Vital signs reviewed, please refer to chart. Head is normocephalic, atraumatic. 

Pupils equal round, reactive to light.  Neck is supple without meningismus.  

Heart is regular rate and rhythm.  Lungs are clear to auscultation bilaterally. 

Abdomen is soft, nontender, normoactive bowel sounds throughout.  Examination of

the spine yields no midline tenderness or step-off.  She has mild paraspinal 

musculature tenderness from the lower lumbar spine into the sacrum bilaterally. 

Extremities without cyanosis, clubbing. Posterior calves are nontender.  She is 

tender to palpation over the left fibular head.  She has 1+ nonpitting edema to 

the bilateral ankles.  Peripheral pulses are equal.  Skin is warm and dry.  

Patient is awake, alert, neurological exam is nonfocal.





Course





- Re-evaluation


Re-evalutation: 





05/26/20 22:51


Patient presents to the emergency department for evaluation.  Her symptoms are 

most consistent with sciatica.  She had lumbar spine films as well as Doppler 

ordered through triage.  DVT was not identified.  I suspect strongly that this 

is sciatic type pain in this patient with some lumbar spinal stenosis and 

arthritis.  I would head and treat the patient with steroids.  She was given 

prednisone.  I will send her home with prednisone and close follow-up.  She is 

to return to the ED with worsening.





- Vital Signs


Vital signs: 





                                        











Temp Pulse Resp BP Pulse Ox


 


 98.3 F   60   18   123/72   98 


 


 05/26/20 21:48  05/26/20 21:48  05/26/20 18:44  05/26/20 21:48  05/26/20 21:48














- Diagnostic Test


Radiology reviewed: Reports reviewed


Radiology results interpreted by me: 





05/26/20 22:53





                                        





Lumbar Spine X-Ray  05/26/20 18:52


IMPRESSION:  Minimal scoliosis.  Degenerative disc disease and spondylosis.  

Facet arthropathy.


 








Venous Doppler Study  05/26/20 18:52


IMPRESSION:


 


Negative exam


 


 


copyright 2011 [employer]- All Rights Reserved


 














Discharge





- Discharge


Clinical Impression: 


 Leg pain, left





Sciatica


Qualifiers:


 Laterality: left Qualified Code(s): M54.32 - Sciatica, left side





Condition: Stable


Disposition: HOME, SELF-CARE


Instructions:  Sciatica ([facility])


Additional Instructions: 


Moist heat to the lower back.  Take medication as prescribed until it is gone.  

Follow-up with your primary care provider this week.  Return to the emergency 

department with worsening or new concerning symptoms of any sort.


Referrals: 


AXEL CABRERA MD [Primary Care Provider] - Follow up as needed

## 2020-05-26 NOTE — ER DOCUMENT REPORT
ED Medical Screen (RME)





- General


Chief Complaint: Leg Pain


Stated Complaint: LEG PAIN


Time Seen by Provider: 05/26/20 18:51


Primary Care Provider: 


AXEL CABRERA MD [Primary Care Provider] - Follow up as needed


Mode of Arrival: Wheelchair


Information source: Patient


Notes: 





70-year-old female presented to ED for complaint of left lower leg pain for the 

last several days.  She is on treatment #19 of 33 XRT treatments for vulvar 

cancer today.  She is alert oriented respirations regular nonlabored speaking in

full sentences.  She does complaint of a lot of pain to the lower leg.

















I have greeted and performed a rapid initial assessment of this patient.  A 

comprehensive ED assessment and evaluation of the patient, analysis of test 

results and completion of medical decision making process will be conducted by 

an additional ED providers.


TRAVEL OUTSIDE OF THE [place] IN LAST 30 DAYS: No





- Related Data


Allergies/Adverse Reactions: 


                                        





No Known Allergies Allergy (Verified 05/26/20 18:46)


   











Past Medical History





- Social History


Chew tobacco use (# tins/day): No


Frequency of alcohol use: None





- Past Medical History


Cardiac Medical History: Reports: Hx Hypertension


   Denies: Hx Atrial Fibrillation, Hx Congestive Heart Failure, Hx Heart Attack


Neurological Medical History: Denies: Hx Cerebrovascular Accident


Renal/ Medical History: Denies: Hx Peritoneal Dialysis


Musculoskeltal Medical History: Reports Hx Arthritis


Past Surgical History: Reports: Hx Herniorrhaphy, Hx Tonsillectomy, Hx Tubal 

Ligation





- Immunizations


Hx Diphtheria, Pertussis, Tetanus Vaccination: No - unk





Physical Exam





- Vital signs


Vitals: 





                                        











Temp Pulse Resp BP Pulse Ox


 


 99.2 F   91   18   196/88 H  99 


 


 05/26/20 18:44  05/26/20 18:44  05/26/20 18:44  05/26/20 18:44  05/26/20 18:44














Course





- Vital Signs


Vital signs: 





                                        











Temp Pulse Resp BP Pulse Ox


 


 99.2 F   91   18   196/88 H  99 


 


 05/26/20 18:47  05/26/20 18:44  05/26/20 18:44  05/26/20 18:44  05/26/20 18:44














Doctor's Discharge





- Discharge


Referrals: 


AXEL CABRERA MD [Primary Care Provider] - Follow up as needed

## 2020-05-26 NOTE — RADIOLOGY REPORT (SQ)
EXAM DESCRIPTION: 



US EXTREMITY VEINS UNILATERAL



COMPLETED DATE/TME:  05/26/2020 18:52



CLINICAL HISTORY: 



70 years, Female, Left leg pain tenderness, XRT for vulvar cancer



COMPARISON:

None.



TECHNIQUE:

Transverse and longitudinal sonographic images of the left lower

extremity deep venous system



LIMITATIONS:

None.



FINDINGS:



No visible areas of thrombus. Normal compression and augmentation

throughout. Doppler images are unremarkable



IMPRESSION:



Negative exam

 



copyright 2011 [employer]- All Rights Reserved

## 2020-05-26 NOTE — RADIOLOGY REPORT (SQ)
EXAM DESCRIPTION:  L SPINE WHOLE



IMAGES COMPLETED DATE/TIME:  5/26/2020 7:07 pm



REASON FOR STUDY:  Pain to the lateral left lower leg



COMPARISON:  None.



NUMBER OF VIEWS:  Five views including obliques.



TECHNIQUE:  AP, lateral, oblique, and sacral radiographic images acquired of the lumbar spine.



LIMITATIONS:  None.



FINDINGS:  MINERALIZATION: Normal.

SEGMENTATION: Normal.  No transitional anatomy.

ALIGNMENT: Minimal scoliosis.

VERTEBRAE: Maintained height.  No fracture or worrisome bone lesion.

DISCS: Disc narrowing throughout the lumbar spine, most prominently at L1-2 and at L5-S1.  Marginal o
steophytes are present.

POSTERIOR ELEMENTS: Hypertrophic facet changes at L5-S1.

HARDWARE: None in the spine.

PARASPINAL SOFT TISSUES: Normal.

PELVIS: Intact as visualized. No fractures or worrisome bone lesions. SI joints intact.

OTHER: No other significant finding.



IMPRESSION:  Minimal scoliosis.  Degenerative disc disease and spondylosis.  Facet arthropathy.



TECHNICAL DOCUMENTATION:  JOB ID:  [id number]

 2011 [employer]- All Rights Reserved



Reading location - IP/workstation name: [email or web address]

## 2020-08-02 ENCOUNTER — HOSPITAL ENCOUNTER (EMERGENCY)
Dept: HOSPITAL 62 - ER | Age: 70
Discharge: HOME | End: 2020-08-02
Payer: MEDICARE

## 2020-08-02 VITALS — DIASTOLIC BLOOD PRESSURE: 80 MMHG | SYSTOLIC BLOOD PRESSURE: 142 MMHG

## 2020-08-02 DIAGNOSIS — R07.9: Primary | ICD-10-CM

## 2020-08-02 DIAGNOSIS — I11.9: ICD-10-CM

## 2020-08-02 DIAGNOSIS — Z87.891: ICD-10-CM

## 2020-08-02 LAB
ADD MANUAL DIFF: NO
ALBUMIN SERPL-MCNC: 4.1 G/DL (ref 3.5–5)
ALP SERPL-CCNC: 82 U/L (ref 38–126)
ANION GAP SERPL CALC-SCNC: 5 MMOL/L (ref 5–19)
AST SERPL-CCNC: 26 U/L (ref 14–36)
BASOPHILS # BLD AUTO: 0 10^3/UL (ref 0–0.2)
BASOPHILS NFR BLD AUTO: 0.7 % (ref 0–2)
BILIRUB DIRECT SERPL-MCNC: 0 MG/DL (ref 0–0.4)
BILIRUB SERPL-MCNC: 0.7 MG/DL (ref 0.2–1.3)
BUN SERPL-MCNC: 12 MG/DL (ref 7–20)
CALCIUM: 9.6 MG/DL (ref 8.4–10.2)
CHLORIDE SERPL-SCNC: 106 MMOL/L (ref 98–107)
CO2 SERPL-SCNC: 28 MMOL/L (ref 22–30)
EOSINOPHIL # BLD AUTO: 0.2 10^3/UL (ref 0–0.6)
EOSINOPHIL NFR BLD AUTO: 4.1 % (ref 0–6)
ERYTHROCYTE [DISTWIDTH] IN BLOOD BY AUTOMATED COUNT: 14.8 % (ref 11.5–14)
GLUCOSE SERPL-MCNC: 95 MG/DL (ref 75–110)
HCT VFR BLD CALC: 33 % (ref 36–47)
HGB BLD-MCNC: 11.7 G/DL (ref 12–15.5)
LYMPHOCYTES # BLD AUTO: 1.4 10^3/UL (ref 0.5–4.7)
LYMPHOCYTES NFR BLD AUTO: 36.4 % (ref 13–45)
MCH RBC QN AUTO: 33 PG (ref 27–33.4)
MCHC RBC AUTO-ENTMCNC: 35.4 G/DL (ref 32–36)
MCV RBC AUTO: 93 FL (ref 80–97)
MONOCYTES # BLD AUTO: 0.3 10^3/UL (ref 0.1–1.4)
MONOCYTES NFR BLD AUTO: 9 % (ref 3–13)
NEUTROPHILS # BLD AUTO: 1.9 10^3/UL (ref 1.7–8.2)
NEUTS SEG NFR BLD AUTO: 49.8 % (ref 42–78)
PLATELET # BLD: 144 10^3/UL (ref 150–450)
POTASSIUM SERPL-SCNC: 4 MMOL/L (ref 3.6–5)
PROT SERPL-MCNC: 7.3 G/DL (ref 6.3–8.2)
RBC # BLD AUTO: 3.53 10^6/UL (ref 3.72–5.28)
TOTAL CELLS COUNTED % (AUTO): 100 %
WBC # BLD AUTO: 3.9 10^3/UL (ref 4–10.5)

## 2020-08-02 PROCEDURE — 85025 COMPLETE CBC W/AUTO DIFF WBC: CPT

## 2020-08-02 PROCEDURE — 93005 ELECTROCARDIOGRAM TRACING: CPT

## 2020-08-02 PROCEDURE — 93010 ELECTROCARDIOGRAM REPORT: CPT

## 2020-08-02 PROCEDURE — 36415 COLL VENOUS BLD VENIPUNCTURE: CPT

## 2020-08-02 PROCEDURE — 84484 ASSAY OF TROPONIN QUANT: CPT

## 2020-08-02 PROCEDURE — 71045 X-RAY EXAM CHEST 1 VIEW: CPT

## 2020-08-02 PROCEDURE — 80053 COMPREHEN METABOLIC PANEL: CPT

## 2020-08-02 PROCEDURE — 99285 EMERGENCY DEPT VISIT HI MDM: CPT

## 2020-08-02 NOTE — ER DOCUMENT REPORT
ED Medical Screen (RME)





- General


Chief Complaint: Chest Pain


Stated Complaint: CHEST PAIN,WEAKNESS


Time Seen by Provider: 08/02/20 13:26


Primary Care Provider: 


AXEL CABRERA MD [Primary Care Provider] - Follow up as needed


Notes: 





HPI: 70-year-old female who just finished radiation therapy for vulvar cancer 6 

weeks ago at [facility] presenting for chest discomfort this morning.  Patient 

states that she had an uncomfortable feeling in the left chest of her breakfast 

this morning took 2 baby aspirin and it seemed to go away after several minutes.

 About an hour ago had a sharp left chest discomfort with no shortness of breath

that lasted for approximately 10 minutes and is now resolved.  She was concerned

about her heart.  No current shortness of breath.  No abdominal pain





PHYSICAL EXAMINATION: Lung sounds are clear to auscultation regular rate and 

rhythm.  EKG sinus rhythm but has some lateral T wave flattening relative to old

EKG











I have greeted and performed a rapid initial assessment of this patient.  A 

comprehensive ED assessment and evaluation of the patient, analysis of test 

results and completion of medical decision making process will be conducted by 

an additional ED providers.


TRAVEL OUTSIDE OF THE [place] IN LAST 30 DAYS: No





- Related Data


Allergies/Adverse Reactions: 


                                        





No Known Allergies Allergy (Verified 05/26/20 18:46)


   











Past Medical History





- Social History


Frequency of alcohol use: Rare


Drug Abuse: None





- Past Medical History


Cardiac Medical History: Reports: Hx Hypertension


   Denies: Hx Atrial Fibrillation, Hx Congestive Heart Failure, Hx Heart Attack


Neurological Medical History: Denies: Hx Cerebrovascular Accident


Renal/ Medical History: Denies: Hx Peritoneal Dialysis


Musculoskeltal Medical History: Reports Hx Arthritis


Past Surgical History: Reports: Hx Herniorrhaphy, Hx Tonsillectomy, Hx Tubal 

Ligation





- Immunizations


Hx Diphtheria, Pertussis, Tetanus Vaccination: No - unk





Physical Exam





- Vital signs


Vitals: 





                                        











Temp Pulse Resp BP Pulse Ox


 


 98.5 F   75   16   178/92 H  100 


 


 08/02/20 12:51  08/02/20 12:51  08/02/20 12:51  08/02/20 12:51  08/02/20 12:51














Course





- Vital Signs


Vital signs: 





                                        











Temp Pulse Resp BP Pulse Ox


 


 98.5 F   75   16   178/92 H  100 


 


 08/02/20 12:51  08/02/20 12:51  08/02/20 12:51  08/02/20 12:51  08/02/20 12:51














Doctor's Discharge





- Discharge


Referrals: 


AXEL CABRERA MD [Primary Care Provider] - Follow up as needed

## 2020-08-02 NOTE — ER DOCUMENT REPORT
ED General





- General


Chief Complaint: Chest Pain


Stated Complaint: CHEST PAIN,WEAKNESS


Time Seen by Provider: 20 13:26


Primary Care Provider: 


AXEL CABRERA MD [Primary Care Provider] - Follow up as needed


Mode of Arrival: Ambulatory


Information source: Patient


Notes: 





70-year-old woman presenting to the emergency department with a complaint of 

pain in her left chest.  States that she woke up this morning with pain in her 

left side.  She took 2 aspirin the symptoms improved.  At approximately 1030 

this morning she had a sharp pain in her left chest nonradiating, she became 

nervous and concerned.  The pain lasted for approximately 5 minutes, nonradiatin

g, no associated diaphoresis or nausea.


TRAVEL OUTSIDE OF THE [place] IN LAST 30 DAYS: No





- Related Data


Allergies/Adverse Reactions: 


                                        





No Known Allergies Allergy (Verified 20 18:46)


   











Past Medical History





- Social History


Smoking Status: Former Smoker


Frequency of alcohol use: Rare


Drug Abuse: None


Family History: Reviewed & Not Pertinent, DM, Hypertension, Thyroid Disfunction





- Past Medical History


Cardiac Medical History: Reports: Hx Hypertension


   Denies: Hx Atrial Fibrillation, Hx Congestive Heart Failure, Hx Heart Attack


Neurological Medical History: Denies: Hx Cerebrovascular Accident


Renal/ Medical History: Denies: Hx Peritoneal Dialysis


Musculoskeletal Medical History: Reports Hx Arthritis


Past Surgical History: Reports: Hx Herniorrhaphy, Hx Tonsillectomy, Hx Tubal 

Ligation





- Immunizations


Hx Diphtheria, Pertussis, Tetanus Vaccination: No - unk





Review of Systems





- Review of Systems


Notes: 





Constitutional: Negative for fever.


HENT: Negative for sore throat.


Eyes: Negative for visual changes.


Cardiovascular: + Chest pain.


Respiratory: Negative for shortness of breath.


Gastrointestinal: Negative for abdominal pain, vomiting or diarrhea.


Genitourinary: Negative for dysuria.


Musculoskeletal: Negative for back pain.


Skin: Negative for rash.


Neurological: Negative for headaches, weakness or numbness.





10 point ROS negative except as marked above and in HPI.





Physical Exam





- Vital signs


Vitals: 


                                        











Temp Pulse Resp BP Pulse Ox


 


 98.5 F   75   16   178/92 H  100 


 


 20 12:51  20 12:51  20 12:51  20 12:51  20 12:51














- Notes


Notes: 





PHYSICAL EXAMINATION:


 


Physical Exam:


General: Well-nourished well-developed 70-year-old woman in no acute distress


HEENT: NC/AT, pupils equal round and reactive to light, MM moist,nares clear, 

oropharynx clear, airway patent


Neck: supple, no adenopathy, no masses.  Good range of motion


Lungs: clear, no wheezing, no rales no rhonchi


CVS: Regular rate and rhythm no murmur gallop or rub


Abdomen: Soft, active, nontender, no masses, no hepatosplenomegaly


Ext:   No edema, clubbing or cyanosis.


Neuro: Alert and responsive, moving all 4 extremities on command, cranial nerves

intact, no focal findings


Skin: Intact no open lesions, no rash





 








Course





- Re-evaluation


Re-evalutation: 





20 19:37


Patient had a fleeting episode of chest pain lasting approximately 5 minutes, lo

w suspicion for acute coronary syndrome.  EKG and repeat troponins are negative.

 She has had no further episodes of chest discomfort while in the emergency 

department.  Splane to the patient while her test and work-up have been negative

it is important that she follow-up with her primary care doctor for further 

evaluation and treatment.





HEART Score:








HEART score for chest pain patients 





Score





History  


 Moderately suspicious  1 


 


ECG   


Age  > or=65 year  2 


 


Risk factors  1 or 2 risk factors  1 


 


Troponin   =normal limit  0 


 


 Total =m 4








 If HEART score is = 3 AND both tronponin measurments are normal, the 30 day 

risk of a major adverse cardiac event (all-cause mortality, myocardia infarction

or need for coronary revscularization) is < 1% (Sensitivity 100%, %).








Chest pain in a patient without evidence of cardiac or other serious etiology on

workup today. I discussed with patient that, based on their age, risk factors 

and emergency department testing today, the likelihood that their symptoms are 

related to a heart attack is very low (estimated risk of heart attack or death 

over the next 30 days of less than 1%).  The patient demonstrates decision 

making capacity and has verbalized an understanding of these risks to me. Based 

on this,  the patient has chosen to follow-up as an outpatient. Usual chest pain

return precautions reviewed. The patient states understanding and agreement with

this plan.





- Vital Signs


Vital signs: 


                                        











Temp Pulse Resp BP Pulse Ox


 


 97.6 F   75   15   132/79 H  99 


 


 20 16:19  20 12:51  20 17:01  20 17:01  20 17:01














- Laboratory


Result Diagrams: 


                                 20 13:53





                                 20 13:53


Laboratory results interpreted by me: 


                                        











  20





  13:53


 


WBC  3.9 L


 


RBC  3.53 L


 


Hgb  11.7 L


 


Hct  33.0 L


 


RDW  14.8 H


 


Plt Count  144 L











20 19:40


I have reviewed laboratory data and used this information for the treatment 

decisions regarding the patient.





- Diagnostic Test


Radiology reviewed: Image reviewed, Reports reviewed





- EKG Interpretation by Me


EKG shows normal: Sinus rhythm, Axis - Normal, Intervals - Prolonged QT 

interval, QRS Complexes - + Criteria for LVH, ST-T Waves - Repolarization 

abnormality related to LVH, no ischemic changes.


Rate: Normal - Rate 73





Discharge





- Discharge


Clinical Impression: 


Chest pain


Qualifiers:


 Chest pain type: unspecified Qualified Code(s): R07.9 - Chest pain, unspecified





Condition: Good


Disposition: HOME, SELF-CARE


Instructions:  Chest Pain of Unclear Cause ([facility])


Additional Instructions: 


You were seen in the emergency department today with an episode of chest pain, 

it was not typical for cardiac related pain, however with your age and your risk

factors it is important that you follow-up with your primary care doctor for 

further evaluation and treatment.  A stress test would not be an unreasonable 

test to have done as well as a possible echocardiogram.  If you are having 

recurrent episodes of pain or other concerns you may return to the emergency 

department for reevaluation.








HOME CARE INSTRUCTIONS & INFORMATION:  Thank you for choosing us for your 

medical needs. We hope you're satisfied with the care you received.  After you 

leave, you must properly care for your problem and, at the same time, observe i

ts progress.  Any condition can change.  Some illnesses can change rapidly over 

hours or days.  If your condition worsens, return to the Emergency Department or

see your physician promptly.





ABOUT YOUR X-RAYS AND EKG'S:   If you had an EKG or X-rays taken, they have been

read by the Emergency Physician. The X-rays and EKG's will also be read by a 

Radiologist or Cardiologist within 24 hours.  If discrepancies are noted, you 

will be notified by telephone.  Please be certain the ED has a correct telephone

number & address where you can be reached.  Also, realize that some fractures or

abnormalities do not show up on initial X-rays.  If your symptoms continue, see 

your physician.





ABOUT YOUR LABORATORY TEST:   If you had laboratory tests, the results have been

reviewed by the Emergency Physician.  Some test results (for example cultures) 

may not be available for several days.  You will be contacted if any test result

shows you need additional treatment.  Please be certain the ED has a correct 

telephone number and address where you can be reached.





ABOUT YOUR MEDICATIONS:  You will receive instructions on how to take your 

medicine on the prescription label you receive.  Additional information may be 

provided by the Pharmacy.  If you have questions afterwards, call the ED for 

clarification or further instructions.  Some prescribed medications may cause 

drowsiness.  Do not perform tasks such as driving a car or operating machinery 

without consulting your Pharmacist.  If you feel you need a refill of pain 

medication, your condition will need re-evaluation.  Please do not call for a 

refill of any medication.





ABOUT YOUR SIGNATURE:   Signature of this document acknowledges to followin. Understanding that you received emergency treatment and that you may be 

released before al medical problems are known or treated. Please be certain   

the ED has a correct phone number & address where you can be reached.


   2. Acknowledgement that you will arrange for follow-up care as recommended.


   3. Authorization for the Emergency Physician to provide information to your 

follow-up Physician in order to maximize your care.





AT ANY TIME, IF YOUR SYMPTOMS CHANGE SIGNIFICANTLY OR WORSEN OR YOU DEVELOP NEW 

SYMPTOMS, RETURN TO THE EMERGENCY DEPARTMENT IMMEDIATELY FOR RE-EVALUATION.





OUR GOAL IS TO PROVIDE EXCELLENT MEDICAL CARE!





WE HOPE THAT WE HAVE MET YOUR EXPECTATIONS DURING YOUR EMERGENCY DEPARTMENT 

VISIT AND THAT YOU FEEL YOU HAVE RECEIVED EXCELLENT CARE!











Referrals: 


AXEL CABRERA MD [Primary Care Provider] - Follow up as needed

## 2020-08-03 NOTE — EKG REPORT
SEVERITY:- ABNORMAL ECG -

SINUS RHYTHM

PROBABLE LEFT ATRIAL ABNORMALITY

LEFT VENTRICULAR HYPERTROPHY

ANTERIOR Q WAVES, POSSIBLY DUE TO LVH

BORDERLINE PROLONGED QT INTERVAL

:

Confirmed by: Samreen Yoder MD 03-Aug-2020 01:17:15

## 2024-02-12 NOTE — ER DOCUMENT REPORT
ED General





- General


Chief Complaint: Abdominal Pain


Stated Complaint: ABDOMINAL SWELLING


Time Seen by Provider: 11/26/19 12:40


Primary Care Provider: 


AXEL CABRERA MD [Primary Care Provider] - Follow up as needed


Mode of Arrival: Ambulatory


Information source: Patient


TRAVEL OUTSIDE OF THE [place] IN LAST 30 DAYS: No





- HPI


Notes: 





Patient presents with left lower chest discomfort and epigastric discomfort.  

She states is been going on for several days.  She denies it is pain but states 

that it is discomfort.  She states nothing makes it better or worse.  It is 

intermittent.  It is mild to moderate.  It seems to radiate from her abdomen up 

into her chest.  She denies any significant shortness of breath.  She states 

upon further interrogation that she has had this pain actually for a little over

a month and has seen a cardiologist for it.  She states cardiologist ordered a 

stress test that is going to be done tomorrow.  She states that her abdomen also

feels bloated and distended.  No vomiting slightly decreased appetite.  No 

problems with urine or stool.  She states she has not had a stress test" years" 

and that the last one was normal.  No previous history of coronary artery 

disease.





- Related Data


Allergies/Adverse Reactions: 


                                        





No Known Allergies Allergy (Verified 11/26/19 15:00)


   








Home Medications: Xanax





Past Medical History





- General


Information source: Patient





- Social History


Smoking Status: Never Smoker


Chew tobacco use (# tins/day): No


Frequency of alcohol use: None


Drug Abuse: None


Family History: Reviewed & Not Pertinent, DM, Hypertension, Thyroid Disfunction


Patient has suicidal ideation: No


Patient has homicidal ideation: No





- Past Medical History


Cardiac Medical History: Reports: Hx Hypertension


   Denies: Hx Atrial Fibrillation, Hx Congestive Heart Failure, Hx Heart Attack


Neurological Medical History: Denies: Hx Cerebrovascular Accident


Renal/ Medical History: Denies: Hx Peritoneal Dialysis


Musculoskeletal Medical History: Reports Hx Arthritis


Past Surgical History: Reports: Hx Herniorrhaphy, Hx Tonsillectomy, Hx Tubal 

Ligation





- Immunizations


Hx Diphtheria, Pertussis, Tetanus Vaccination: No - unk





Review of Systems





- Review of Systems


Constitutional: denies: Chills, Fever


Cardiovascular: Chest pain.  denies: Palpitations


Respiratory: denies: Cough, Short of breath


-: Yes All other systems reviewed and negative





Physical Exam





- Vital signs


Vitals: 


                                        











Temp Resp BP Pulse Ox


 


 98.7 F   13   159/84 H  100 


 


 11/26/19 12:45  11/26/19 12:45  11/26/19 12:45  11/26/19 12:45











Interpretation: Hypertensive





- General


General appearance: Appears well, Alert





- HEENT


Head: Normocephalic, Atraumatic


Eyes: Normal


Pupils: PERRL





- Respiratory


Respiratory status: No respiratory distress


Chest status: Nontender


Breath sounds: Normal


Chest palpation: Normal





- Cardiovascular


Rhythm: Regular


Heart sounds: Normal auscultation


Murmur: No





- Abdominal


Inspection: Normal


Distension: No distension


Bowel sounds: Normal


Tenderness: Nontender


Organomegaly: No organomegaly





- Back


Back: Normal, Nontender





- Extremities


General upper extremity: Normal inspection, Nontender, Normal color, Normal ROM,

Normal temperature


General lower extremity: Normal inspection, Nontender, Normal color, Normal ROM,

Normal temperature, Normal weight bearing.  No: Andres's sign





- Neurological


Neuro grossly intact: Yes


Cognition: Normal


Orientation: AAOx4


Marion Coma Scale Eye Opening: Spontaneous


Ruchi Coma Scale Verbal: Oriented


Marion Coma Scale Motor: Obeys Commands


Marion Coma Scale Total: 15


Speech: Normal


Motor strength normal: LUE, RUE, LLE, RLE


Sensory: Normal





- Psychological


Associated symptoms: Normal affect, Normal mood





- Skin


Skin Temperature: Warm


Skin Moisture: Dry


Skin Color: Normal





Course





- Re-evaluation


Re-evalutation: 





11/26/19 17:18


Patient presents with abdominal and chest discomfort.  She denies that it is 

pain.  EKG had some changes at her primary care doctor's office today so she was

sent here for further evaluation.  EKG here does not show any significant 

ischemic changes on my evaluation.  She also has a negative troponin.  She 

appears to have had this discomfort now for a little over a month and does have 

a stress test scheduled for tomorrow.  However patient has a heart score of 4 

and it seems most prudent to just have the patient admitted for the stress test.





- Vital Signs


Vital signs: 


                                        











Temp Pulse Resp BP Pulse Ox


 


 98.7 F      14   153/85 H  100 


 


 11/26/19 12:45     11/26/19 15:00  11/26/19 15:00  11/26/19 15:00














- Laboratory


Result Diagrams: 


                                 11/26/19 12:31





                                 11/26/19 12:31


Laboratory results interpreted by me: 


                                        











  11/26/19 11/26/19





  12:31 12:31


 


RBC  3.69 L 


 


Hct  33.7 L 


 


Plt Count  149 L 


 


Potassium   3.3 L














- Diagnostic Test


Radiology reviewed: Image reviewed, Reports reviewed





- EKG Interpretation by Me


EKG shows normal: Sinus rhythm


Rate: Normal - 64


Rhythm: NSR


Voltage: Consistant with LVH





Discharge





- Discharge


Clinical Impression: 


Chest pain


Qualifiers:


 Chest pain type: unspecified Qualified Code(s): R07.9 - Chest pain, unspecified





Condition: Stable


Disposition: ADMITTED AS INPATIENT


Admitting Provider: Annita (Hospitalist)


Unit Admitted: Telemetry


Referrals: 


AXEL CABRERA MD [Primary Care Provider] - Follow up as needed
If your Intravenous (IV) site becomes red, swollen or painful, call your doctor.

## 2024-04-05 NOTE — RADIOLOGY REPORT (SQ)
----- Message from Jaime Urias MD sent at 4/3/2024 10:35 PM EDT -----  BMP needs to be repeated------Dot to draw   EXAM DESCRIPTION:  CHEST SINGLE VIEW



IMAGES COMPLETED DATE/TIME:  8/2/2020 2:41 pm



REASON FOR STUDY:  chest pain



COMPARISON:  3/11/2017



TECHNIQUE:  Single frontal radiographic view of the chest acquired.



NUMBER OF VIEWS:  One view.



LIMITATIONS:  None.



FINDINGS:  LUNGS AND PLEURA: No pneumothorax. No consolidation or pleural effusion.

MEDIASTINUM AND HILAR STRUCTURES: Stable.

HEART AND VASCULAR STRUCTURES: Stable.

BONES: No acute findings.

HARDWARE: None in the chest.

OTHER: No other significant finding.



IMPRESSION:  NO ACUTE FINDINGS.



TECHNICAL DOCUMENTATION:  JOB ID:  [id number]

[id number]

 2011 [employer]- All Rights Reserved



Reading location - IP/workstation name: [email or web address]